# Patient Record
Sex: MALE | Race: WHITE | NOT HISPANIC OR LATINO | Employment: UNEMPLOYED | URBAN - NONMETROPOLITAN AREA
[De-identification: names, ages, dates, MRNs, and addresses within clinical notes are randomized per-mention and may not be internally consistent; named-entity substitution may affect disease eponyms.]

---

## 2022-12-01 PROCEDURE — 93306 TTE W/DOPPLER COMPLETE: CPT | Performed by: INTERNAL MEDICINE

## 2022-12-07 ENCOUNTER — OUTSIDE FACILITY SERVICE (OUTPATIENT)
Dept: CARDIOLOGY | Facility: CLINIC | Age: 57
End: 2022-12-07

## 2023-04-26 ENCOUNTER — OFFICE VISIT (OUTPATIENT)
Dept: CARDIOLOGY | Facility: CLINIC | Age: 58
End: 2023-04-26
Payer: COMMERCIAL

## 2023-04-26 VITALS
HEIGHT: 61 IN | SYSTOLIC BLOOD PRESSURE: 140 MMHG | WEIGHT: 163 LBS | DIASTOLIC BLOOD PRESSURE: 80 MMHG | BODY MASS INDEX: 30.78 KG/M2 | HEART RATE: 83 BPM

## 2023-04-26 DIAGNOSIS — E78.00 HYPERCHOLESTEREMIA: ICD-10-CM

## 2023-04-26 DIAGNOSIS — R06.02 SHORTNESS OF BREATH: ICD-10-CM

## 2023-04-26 DIAGNOSIS — R01.1 MURMUR, CARDIAC: ICD-10-CM

## 2023-04-26 DIAGNOSIS — R09.89 BRUIT OF RIGHT CAROTID ARTERY: ICD-10-CM

## 2023-04-26 DIAGNOSIS — E88.81 METABOLIC SYNDROME: ICD-10-CM

## 2023-04-26 DIAGNOSIS — R07.89 CHEST PRESSURE: Primary | ICD-10-CM

## 2023-04-26 DIAGNOSIS — I10 PRIMARY HYPERTENSION: ICD-10-CM

## 2023-04-26 DIAGNOSIS — R42 DIZZINESS: ICD-10-CM

## 2023-04-26 PROBLEM — E88.810 METABOLIC SYNDROME: Status: ACTIVE | Noted: 2023-04-26

## 2023-04-26 PROCEDURE — 3079F DIAST BP 80-89 MM HG: CPT | Performed by: INTERNAL MEDICINE

## 2023-04-26 PROCEDURE — 3077F SYST BP >= 140 MM HG: CPT | Performed by: INTERNAL MEDICINE

## 2023-04-26 PROCEDURE — 93000 ELECTROCARDIOGRAM COMPLETE: CPT | Performed by: INTERNAL MEDICINE

## 2023-04-26 PROCEDURE — 1160F RVW MEDS BY RX/DR IN RCRD: CPT | Performed by: INTERNAL MEDICINE

## 2023-04-26 PROCEDURE — 99204 OFFICE O/P NEW MOD 45 MIN: CPT | Performed by: INTERNAL MEDICINE

## 2023-04-26 PROCEDURE — 1159F MED LIST DOCD IN RCRD: CPT | Performed by: INTERNAL MEDICINE

## 2023-04-26 RX ORDER — CITALOPRAM 20 MG/1
20 TABLET ORAL DAILY
COMMUNITY

## 2023-04-26 RX ORDER — RISPERIDONE 1 MG/1
1 TABLET ORAL 2 TIMES DAILY
COMMUNITY

## 2023-04-26 RX ORDER — SIMVASTATIN 10 MG
10 TABLET ORAL NIGHTLY
COMMUNITY
End: 2023-04-26

## 2023-04-26 RX ORDER — TAMSULOSIN HYDROCHLORIDE 0.4 MG/1
1 CAPSULE ORAL DAILY
COMMUNITY

## 2023-04-26 RX ORDER — ROSUVASTATIN CALCIUM 10 MG/1
10 TABLET, COATED ORAL DAILY
Qty: 30 TABLET | Refills: 11 | Status: SHIPPED | OUTPATIENT
Start: 2023-04-26

## 2023-04-26 RX ORDER — LISINOPRIL 10 MG/1
10 TABLET ORAL DAILY
COMMUNITY

## 2023-04-26 RX ORDER — AMLODIPINE BESYLATE 5 MG/1
5 TABLET ORAL DAILY
Qty: 30 TABLET | Refills: 11 | Status: SHIPPED | OUTPATIENT
Start: 2023-04-26

## 2023-04-26 RX ORDER — ASPIRIN 81 MG/1
81 TABLET ORAL DAILY
COMMUNITY

## 2023-04-26 RX ORDER — NITROGLYCERIN 0.4 MG/1
0.4 TABLET SUBLINGUAL
COMMUNITY

## 2023-04-26 RX ORDER — OMEPRAZOLE 20 MG/1
20 CAPSULE, DELAYED RELEASE ORAL DAILY
COMMUNITY

## 2023-04-26 NOTE — LETTER
April 26, 2023       No Recipients    Patient: Byron Madsen   YOB: 1965   Date of Visit: 4/26/2023       Dear Dr. Hale Recipients:    Thank you for referring Byron Madsen to me for evaluation. Below are the relevant portions of my assessment and plan of care.    If you have questions, please do not hesitate to call me. I look forward to following Byron along with you.         Sincerely,        Jaycob Butler MD        CC:   No Recipients    Jaycob Butler MD  04/26/23 1110  Sign when Signing Visit  Chief Complaint   Patient presents with   • Establish Care     PCP referred, murmur, abnormal echo   • Chest Pain     Exertional or with increase stress, chest pressure in mid to left chest that radiates to left shoulder, becomes SOB. Symptoms resolve after resting. Started about 2 years ago, now occurring once a week   • Shortness of Breath     Really SOB with walking up an incline.    • Cardiac history     Murmur found 2 years ago while living in [place], no testing at that time. Echo in Dec 2022 shortly after moving here.    • Labs     Labs from Nov in chart, reports that is the last other than PSA having been rechecked.    • Elevated PSA     Pt said he is to see the Urologist due to elevated PSA but they wanted him seen here first.         CARDIAC COMPLAINTS  chest pressure/discomfort, dyspnea and Dizziness     Subjective    Byron Madsen is a 58 y.o. male came in today for his initial cardiac evaluation.  He has history of hypertension, hypercholesterolemia has been having symptoms of chest pain and shortness of breath for almost 2 years now.  This chest pain initially started on moderate exertion.  It is a tightness in the mid part of the chest radiating to the left shoulder.  It is associated with shortness of breath.  Most of the time the symptoms do get better after taking some rest.  It used to occur once or twice a month but now is occurring almost 2 or 3 times a week.  He also has  been noticing increasing shortness of breath when he walks uphill.  He has to stop multiple times to feel better.  He also has been having episodes of dizziness.  He was told that he had a heart murmur and underwent echocardiogram.  The echocardiogram showed overall LV function was normal.  He does have moderate aortic valvular stenosis and regurgitation with a normal PA pressure.  He also underwent lab work in November.  His blood count was normal.  His blood sugar was 108.  His renal function and liver function was normal.  His cholesterol is 181 but the LDL is still 107.  His vitamin D was slightly low at 23.3.  His TSH was normal.  His PSA was elevated at 14.3.  He does have an appointment to see a neurologist regarding this.  He does have a history of enlarged prostate in the past.  He used to smoke about 1 and a packs a day and quit in 2018.  He uses occasional marijuana.  His mother  with a massive heart attack.  She did have valvular heart disease prior to that.  Heart disease does run in the family where one of his sister also had a heart attack.    Past Surgical History:   Procedure Laterality Date   • ECHO - CONVERTED  2022    @ [facility]. EF 60%. SIOMARA- 1.6. . Mild-Mod AI. Mild MR. RVSP-25 mmHg.       Current Outpatient Medications   Medication Sig Dispense Refill   • aspirin 81 MG EC tablet Take 1 tablet by mouth Daily.     • citalopram (CeleXA) 20 MG tablet Take 1 tablet by mouth Daily.     • Diclofenac Sodium (VOLTAREN) 1 % gel gel APPLY 2 GRAMS TO AFFECTED AREA(S) TWICE A DAY AS NEEDED     • lisinopril (PRINIVIL,ZESTRIL) 10 MG tablet Take 1 tablet by mouth Daily.     • nitroglycerin (NITROSTAT) 0.4 MG SL tablet Place 1 tablet under the tongue Every 5 (Five) Minutes As Needed for Chest Pain. Take no more than 3 doses in 15 minutes.     • omeprazole (priLOSEC) 20 MG capsule Take 1 capsule by mouth Daily.     • risperiDONE (risperDAL) 1 MG tablet Take 1 tablet by mouth 2 (Two) Times a Day.     •  tamsulosin (FLOMAX) 0.4 MG capsule 24 hr capsule Take 1 capsule by mouth Daily.     • VITAMIN D, CHOLECALCIFEROL, PO Take  by mouth Daily.     • amLODIPine (NORVASC) 5 MG tablet Take 1 tablet by mouth Daily. 30 tablet 11   • rosuvastatin (CRESTOR) 10 MG tablet Take 1 tablet by mouth Daily. 30 tablet 11     No current facility-administered medications for this visit.          ALLERGIES:  Patient has no known allergies.    Past Medical History:   Diagnosis Date   • Bipolar disorder    • Depression    • GERD (gastroesophageal reflux disease)    • Heart murmur    • Hyperlipidemia    • Hypertension        Social History     Tobacco Use   Smoking Status Former   • Packs/day: 1.50   • Years: 1.00   • Pack years: 1.50   • Types: Cigarettes   • Quit date:    • Years since quittin.3   Smokeless Tobacco Never          Family History   Problem Relation Age of Onset   • Hypertension Mother    • Hyperlipidemia Mother    • Heart attack Mother          at 78 with MI   • Hypertension Father    • Hyperlipidemia Father    • Heart attack Sister         MI in her 30's   • Hyperlipidemia Sister    • Hypertension Sister    • Diabetes Sister    • Hypertension Sister    • Hyperlipidemia Sister    • Hypertension Sister    • Hyperlipidemia Sister    • Other Brother          at 42, history of brain damage since birth   • Diabetes Maternal Grandmother    • Heart attack Maternal Grandmother    • Diabetes Maternal Grandfather    • Heart attack Maternal Grandfather    • Diabetes Paternal Grandmother    • Heart attack Paternal Grandmother    • Diabetes Paternal Grandfather    • Heart attack Paternal Grandfather        Review of Systems   Constitutional: Negative for decreased appetite and malaise/fatigue.   HENT: Negative for congestion and sore throat.    Eyes: Negative for blurred vision, double vision and visual disturbance.   Cardiovascular: Positive for chest pain and dyspnea on exertion.   Respiratory: Positive for shortness  "of breath. Negative for snoring.    Endocrine: Negative for cold intolerance and heat intolerance.   Hematologic/Lymphatic: Negative for adenopathy. Does not bruise/bleed easily.   Skin: Negative for itching, nail changes and skin cancer.   Musculoskeletal: Negative for arthritis and myalgias.   Gastrointestinal: Negative for abdominal pain, dysphagia and heartburn.   Genitourinary: Negative for bladder incontinence and frequency.   Neurological: Positive for dizziness. Negative for seizures and vertigo.   Psychiatric/Behavioral: Negative for altered mental status.   Allergic/Immunologic: Negative for environmental allergies and hives.       Diabetes- No  Thyroid- normal    Objective      /80 (BP Location: Right arm)   Pulse 83   Ht 154.9 cm (61\")   Wt 73.9 kg (163 lb)   BMI 30.80 kg/m²     Vitals and nursing note reviewed.   Constitutional:       Appearance: Healthy appearance. Not in distress.   Eyes:      Conjunctiva/sclera: Conjunctivae normal.      Pupils: Pupils are equal, round, and reactive to light.   HENT:      Head: Normocephalic.   Pulmonary:      Effort: Pulmonary effort is normal.      Breath sounds: Normal breath sounds.   Cardiovascular:      PMI at left midclavicular line. Normal rate. Regular rhythm.      Murmurs: There is a grade 4/6 harsh midsystolic murmur at the URSB, radiating to the neck. There is a grade 3/4 high frequency blowing decrescendo, early diastolic murmur at the URSB, radiating to the apex.   Abdominal:      General: Bowel sounds are normal.      Palpations: Abdomen is soft.   Musculoskeletal: Normal range of motion.      Cervical back: Normal range of motion and neck supple. Skin:     General: Skin is warm and dry.   Neurological:      Mental Status: Alert, oriented to person, place, and time and oriented to person, place and time.           ECG 12 Lead    Date/Time: 4/26/2023 11:10 AM  Performed by: Jaycob Butler MD  Authorized by: Jaycob Butler MD "   Previous ECG: no previous ECG available  Rhythm: sinus rhythm  Rate: normal  QRS axis: normal  Other findings: non-specific ST-T wave changes    Clinical impression: non-specific ECG             @ASSESSMENT/PLAN@  BMI is >= 30 and <35. (Class 1 Obesity). The following options were offered after discussion;: weight loss educational material (shared in after visit summary), exercise counseling/recommendations and nutrition counseling/recommendations     Diagnoses and all orders for this visit:    1. Chest pressure (Primary)  -     Stress Test With Myocardial Perfusion One Day; Future    2. Shortness of breath  -     Adult Transthoracic Echo Complete W/ Cont if Necessary Per Protocol; Future    3. Primary hypertension  -     amLODIPine (NORVASC) 5 MG tablet; Take 1 tablet by mouth Daily.  Dispense: 30 tablet; Refill: 11    4. Hypercholesteremia  -     Stress Test With Myocardial Perfusion One Day; Future  -     rosuvastatin (CRESTOR) 10 MG tablet; Take 1 tablet by mouth Daily.  Dispense: 30 tablet; Refill: 11    5. Murmur, cardiac  -     Adult Transthoracic Echo Complete W/ Cont if Necessary Per Protocol; Future    6. Dizziness  -     Adult Transthoracic Echo Complete W/ Cont if Necessary Per Protocol; Future  -     US Carotid Bilateral; Future    7. Bruit of right carotid artery  -     US Carotid Bilateral; Future    8. Metabolic syndrome    At baseline his heart rate is stable.  His blood pressure is slightly elevated.  His EKG showed sinus rhythm, short DE interval, diffuse ST-T changes.  His clinical examination reveals a BMI of 31.  He does have a carotid bruit on the right side.  He also has 4/6 ejection systolic murmur at the aortic area and a 3/6 diastolic murmur at the aortic area.   his peripheral pulses were normal.    Regarding the chest pressure, it is little worrisome for coronary artery disease.  The other possibility could be secondary to the valvular heart disease.  His EKG does look little abnormal.   He needs to undergo a stress test.  Since he is not able to walk much, it will be done as a Lexiscan.  Meanwhile continue aspirin at 81 mg once a day    Regarding the shortness of breath, it could be from the valvular heart disease.  Clinically the aortic stenosis seems to be little bit more significant.  I like to repeat the echocardiogram to reevaluate the valvular structures and also to rule out bicuspid aortic valve.    Regarding his hypertension, it is still slightly elevated.  He is already on lisinopril.  I added amlodipine 5 mg once a day.    Regarding his hypercholesterolemia his LDL is still elevated since Zocor can interfere with Norvasc also, I stopped Zocor and start him on Crestor 10 mg once a day    Regarding his cardiac murmur, I did explain to him about the valvular heart disease and I explained to him that when it became significant then he may need to undergo TAVAR    Regarding his dizziness and also questionable bruit on the right side, I scheduled him to undergo carotid ultrasound to evaluate the stenosis    Regarding metabolic syndrome, talked to him about diet and weight reduction.  I gave him papers on Mediterranean diet.              Electronically signed by Jaycob Butler MD April 26, 2023 11:08 EDT

## 2023-04-26 NOTE — PROGRESS NOTES
Chief Complaint   Patient presents with   • Establish Care     PCP referred, murmur, abnormal echo   • Chest Pain     Exertional or with increase stress, chest pressure in mid to left chest that radiates to left shoulder, becomes SOB. Symptoms resolve after resting. Started about 2 years ago, now occurring once a week   • Shortness of Breath     Really SOB with walking up an incline.    • Cardiac history     Murmur found 2 years ago while living in [place], no testing at that time. Echo in Dec 2022 shortly after moving here.    • Labs     Labs from Nov in chart, reports that is the last other than PSA having been rechecked.    • Elevated PSA     Pt said he is to see the Urologist due to elevated PSA but they wanted him seen here first.         CARDIAC COMPLAINTS  chest pressure/discomfort, dyspnea and Dizziness      Subjective   Byron Madsen is a 58 y.o. male came in today for his initial cardiac evaluation.  He has history of hypertension, hypercholesterolemia has been having symptoms of chest pain and shortness of breath for almost 2 years now.  This chest pain initially started on moderate exertion.  It is a tightness in the mid part of the chest radiating to the left shoulder.  It is associated with shortness of breath.  Most of the time the symptoms do get better after taking some rest.  It used to occur once or twice a month but now is occurring almost 2 or 3 times a week.  He also has been noticing increasing shortness of breath when he walks uphill.  He has to stop multiple times to feel better.  He also has been having episodes of dizziness.  He was told that he had a heart murmur and underwent echocardiogram.  The echocardiogram showed overall LV function was normal.  He does have moderate aortic valvular stenosis and regurgitation with a normal PA pressure.  He also underwent lab work in November.  His blood count was normal.  His blood sugar was 108.  His renal function and liver function was normal.   His cholesterol is 181 but the LDL is still 107.  His vitamin D was slightly low at 23.3.  His TSH was normal.  His PSA was elevated at 14.3.  He does have an appointment to see a neurologist regarding this.  He does have a history of enlarged prostate in the past.  He used to smoke about 1 and a packs a day and quit in 2018.  He uses occasional marijuana.  His mother  with a massive heart attack.  She did have valvular heart disease prior to that.  Heart disease does run in the family where one of his sister also had a heart attack.    Past Surgical History:   Procedure Laterality Date   • ECHO - CONVERTED  2022    @ [facility]. EF 60%. SIOMARA- 1.6. . Mild-Mod AI. Mild MR. RVSP-25 mmHg.       Current Outpatient Medications   Medication Sig Dispense Refill   • aspirin 81 MG EC tablet Take 1 tablet by mouth Daily.     • citalopram (CeleXA) 20 MG tablet Take 1 tablet by mouth Daily.     • Diclofenac Sodium (VOLTAREN) 1 % gel gel APPLY 2 GRAMS TO AFFECTED AREA(S) TWICE A DAY AS NEEDED     • lisinopril (PRINIVIL,ZESTRIL) 10 MG tablet Take 1 tablet by mouth Daily.     • nitroglycerin (NITROSTAT) 0.4 MG SL tablet Place 1 tablet under the tongue Every 5 (Five) Minutes As Needed for Chest Pain. Take no more than 3 doses in 15 minutes.     • omeprazole (priLOSEC) 20 MG capsule Take 1 capsule by mouth Daily.     • risperiDONE (risperDAL) 1 MG tablet Take 1 tablet by mouth 2 (Two) Times a Day.     • tamsulosin (FLOMAX) 0.4 MG capsule 24 hr capsule Take 1 capsule by mouth Daily.     • VITAMIN D, CHOLECALCIFEROL, PO Take  by mouth Daily.     • amLODIPine (NORVASC) 5 MG tablet Take 1 tablet by mouth Daily. 30 tablet 11   • rosuvastatin (CRESTOR) 10 MG tablet Take 1 tablet by mouth Daily. 30 tablet 11     No current facility-administered medications for this visit.           ALLERGIES:  Patient has no known allergies.    Past Medical History:   Diagnosis Date   • Bipolar disorder    • Depression    • GERD (gastroesophageal  reflux disease)    • Heart murmur    • Hyperlipidemia    • Hypertension        Social History     Tobacco Use   Smoking Status Former   • Packs/day: 1.50   • Years: 1.00   • Pack years: 1.50   • Types: Cigarettes   • Quit date:    • Years since quittin.3   Smokeless Tobacco Never          Family History   Problem Relation Age of Onset   • Hypertension Mother    • Hyperlipidemia Mother    • Heart attack Mother          at 78 with MI   • Hypertension Father    • Hyperlipidemia Father    • Heart attack Sister         MI in her 30's   • Hyperlipidemia Sister    • Hypertension Sister    • Diabetes Sister    • Hypertension Sister    • Hyperlipidemia Sister    • Hypertension Sister    • Hyperlipidemia Sister    • Other Brother          at 42, history of brain damage since birth   • Diabetes Maternal Grandmother    • Heart attack Maternal Grandmother    • Diabetes Maternal Grandfather    • Heart attack Maternal Grandfather    • Diabetes Paternal Grandmother    • Heart attack Paternal Grandmother    • Diabetes Paternal Grandfather    • Heart attack Paternal Grandfather        Review of Systems   Constitutional: Negative for decreased appetite and malaise/fatigue.   HENT: Negative for congestion and sore throat.    Eyes: Negative for blurred vision, double vision and visual disturbance.   Cardiovascular: Positive for chest pain and dyspnea on exertion.   Respiratory: Positive for shortness of breath. Negative for snoring.    Endocrine: Negative for cold intolerance and heat intolerance.   Hematologic/Lymphatic: Negative for adenopathy. Does not bruise/bleed easily.   Skin: Negative for itching, nail changes and skin cancer.   Musculoskeletal: Negative for arthritis and myalgias.   Gastrointestinal: Negative for abdominal pain, dysphagia and heartburn.   Genitourinary: Negative for bladder incontinence and frequency.   Neurological: Positive for dizziness. Negative for seizures and vertigo.  "  Psychiatric/Behavioral: Negative for altered mental status.   Allergic/Immunologic: Negative for environmental allergies and hives.       Diabetes- No  Thyroid- normal    Objective     /80 (BP Location: Right arm)   Pulse 83   Ht 154.9 cm (61\")   Wt 73.9 kg (163 lb)   BMI 30.80 kg/m²     Vitals and nursing note reviewed.   Constitutional:       Appearance: Healthy appearance. Not in distress.   Eyes:      Conjunctiva/sclera: Conjunctivae normal.      Pupils: Pupils are equal, round, and reactive to light.   HENT:      Head: Normocephalic.   Pulmonary:      Effort: Pulmonary effort is normal.      Breath sounds: Normal breath sounds.   Cardiovascular:      PMI at left midclavicular line. Normal rate. Regular rhythm.      Murmurs: There is a grade 4/6 harsh midsystolic murmur at the URSB, radiating to the neck. There is a grade 3/4 high frequency blowing decrescendo, early diastolic murmur at the URSB, radiating to the apex.   Abdominal:      General: Bowel sounds are normal.      Palpations: Abdomen is soft.   Musculoskeletal: Normal range of motion.      Cervical back: Normal range of motion and neck supple. Skin:     General: Skin is warm and dry.   Neurological:      Mental Status: Alert, oriented to person, place, and time and oriented to person, place and time.           ECG 12 Lead    Date/Time: 4/26/2023 11:10 AM  Performed by: Jaycob Butler MD  Authorized by: Jaycob Butler MD   Previous ECG: no previous ECG available  Rhythm: sinus rhythm  Rate: normal  QRS axis: normal  Other findings: non-specific ST-T wave changes    Clinical impression: non-specific ECG              @ASSESSMENT/PLAN@  BMI is >= 30 and <35. (Class 1 Obesity). The following options were offered after discussion;: weight loss educational material (shared in after visit summary), exercise counseling/recommendations and nutrition counseling/recommendations     Diagnoses and all orders for this visit:    1. Chest " pressure (Primary)  -     Stress Test With Myocardial Perfusion One Day; Future    2. Shortness of breath  -     Adult Transthoracic Echo Complete W/ Cont if Necessary Per Protocol; Future    3. Primary hypertension  -     amLODIPine (NORVASC) 5 MG tablet; Take 1 tablet by mouth Daily.  Dispense: 30 tablet; Refill: 11    4. Hypercholesteremia  -     Stress Test With Myocardial Perfusion One Day; Future  -     rosuvastatin (CRESTOR) 10 MG tablet; Take 1 tablet by mouth Daily.  Dispense: 30 tablet; Refill: 11    5. Murmur, cardiac  -     Adult Transthoracic Echo Complete W/ Cont if Necessary Per Protocol; Future    6. Dizziness  -     Adult Transthoracic Echo Complete W/ Cont if Necessary Per Protocol; Future  -     US Carotid Bilateral; Future    7. Bruit of right carotid artery  -     US Carotid Bilateral; Future    8. Metabolic syndrome    At baseline his heart rate is stable.  His blood pressure is slightly elevated.  His EKG showed sinus rhythm, short AK interval, diffuse ST-T changes.  His clinical examination reveals a BMI of 31.  He does have a carotid bruit on the right side.  He also has 4/6 ejection systolic murmur at the aortic area and a 3/6 diastolic murmur at the aortic area.   his peripheral pulses were normal.    Regarding the chest pressure, it is little worrisome for coronary artery disease.  The other possibility could be secondary to the valvular heart disease.  His EKG does look little abnormal.  He needs to undergo a stress test.  Since he is not able to walk much, it will be done as a Lexiscan.  Meanwhile continue aspirin at 81 mg once a day    Regarding the shortness of breath, it could be from the valvular heart disease.  Clinically the aortic stenosis seems to be little bit more significant.  I like to repeat the echocardiogram to reevaluate the valvular structures and also to rule out bicuspid aortic valve.    Regarding his hypertension, it is still slightly elevated.  He is already on  lisinopril.  I added amlodipine 5 mg once a day.    Regarding his hypercholesterolemia his LDL is still elevated since Zocor can interfere with Norvasc also, I stopped Zocor and start him on Crestor 10 mg once a day    Regarding his cardiac murmur, I did explain to him about the valvular heart disease and I explained to him that when it became significant then he may need to undergo TAVAR    Regarding his dizziness and also questionable bruit on the right side, I scheduled him to undergo carotid ultrasound to evaluate the stenosis    Regarding metabolic syndrome, talked to him about diet and weight reduction.  I gave him papers on Mediterranean diet.               Electronically signed by Jaycob Butler MD April 26, 2023 11:08 EDT

## 2023-05-11 ENCOUNTER — HOSPITAL ENCOUNTER (OUTPATIENT)
Dept: CARDIOLOGY | Facility: HOSPITAL | Age: 58
Discharge: HOME | End: 2023-05-11
Payer: COMMERCIAL

## 2023-05-11 DIAGNOSIS — R01.1 MURMUR, CARDIAC: ICD-10-CM

## 2023-05-11 DIAGNOSIS — R42 DIZZINESS: ICD-10-CM

## 2023-05-11 DIAGNOSIS — R09.89 BRUIT OF RIGHT CAROTID ARTERY: ICD-10-CM

## 2023-05-11 DIAGNOSIS — R07.89 CHEST PRESSURE: ICD-10-CM

## 2023-05-11 DIAGNOSIS — E78.00 HYPERCHOLESTEREMIA: ICD-10-CM

## 2023-05-11 DIAGNOSIS — R06.02 SHORTNESS OF BREATH: ICD-10-CM

## 2023-05-11 LAB
BH CV ECHO MEAS - ACS: 1.09 CM
BH CV ECHO MEAS - AO MAX PG: 40.4 MMHG
BH CV ECHO MEAS - AO MEAN PG: 24.6 MMHG
BH CV ECHO MEAS - AO ROOT DIAM: 2.8 CM
BH CV ECHO MEAS - AO V2 MAX: 308.8 CM/SEC
BH CV ECHO MEAS - AO V2 VTI: 79.9 CM
BH CV ECHO MEAS - AVA(I,D): 1.47 CM2
BH CV ECHO MEAS - EDV(CUBED): 42.1 ML
BH CV ECHO MEAS - EDV(MOD-SP4): 84.2 ML
BH CV ECHO MEAS - EF(MOD-SP4): 49 %
BH CV ECHO MEAS - ESV(CUBED): 5.4 ML
BH CV ECHO MEAS - ESV(MOD-SP4): 42.9 ML
BH CV ECHO MEAS - FS: 49.7 %
BH CV ECHO MEAS - IVS/LVPW: 0.93 CM
BH CV ECHO MEAS - IVSD: 1.4 CM
BH CV ECHO MEAS - LA DIMENSION: 3.5 CM
BH CV ECHO MEAS - LAT PEAK E' VEL: 5.8 CM/SEC
BH CV ECHO MEAS - LV DIASTOLIC VOL/BSA (35-75): 48.6 CM2
BH CV ECHO MEAS - LV MASS(C)D: 181.6 GRAMS
BH CV ECHO MEAS - LV MAX PG: 7.1 MMHG
BH CV ECHO MEAS - LV MEAN PG: 4.6 MMHG
BH CV ECHO MEAS - LV SYSTOLIC VOL/BSA (12-30): 24.8 CM2
BH CV ECHO MEAS - LV V1 MAX: 133.3 CM/SEC
BH CV ECHO MEAS - LV V1 VTI: 34.7 CM
BH CV ECHO MEAS - LVIDD: 3.5 CM
BH CV ECHO MEAS - LVIDS: 1.75 CM
BH CV ECHO MEAS - LVOT AREA: 3.4 CM2
BH CV ECHO MEAS - LVOT DIAM: 2.08 CM
BH CV ECHO MEAS - LVPWD: 1.5 CM
BH CV ECHO MEAS - MED PEAK E' VEL: 5 CM/SEC
BH CV ECHO MEAS - MV A MAX VEL: 116.8 CM/SEC
BH CV ECHO MEAS - MV DEC SLOPE: 564.2 CM/SEC2
BH CV ECHO MEAS - MV E MAX VEL: 158 CM/SEC
BH CV ECHO MEAS - MV E/A: 1.35
BH CV ECHO MEAS - MV MAX PG: 12.5 MMHG
BH CV ECHO MEAS - MV MEAN PG: 4.4 MMHG
BH CV ECHO MEAS - MV P1/2T: 91.6 MSEC
BH CV ECHO MEAS - MV V2 VTI: 57.2 CM
BH CV ECHO MEAS - MVA(P1/2T): 2.4 CM2
BH CV ECHO MEAS - MVA(VTI): 2.06 CM2
BH CV ECHO MEAS - RAP SYSTOLE: 10 MMHG
BH CV ECHO MEAS - RVDD: 2.7 CM
BH CV ECHO MEAS - RVSP: 39.5 MMHG
BH CV ECHO MEAS - SI(MOD-SP4): 23.8 ML/M2
BH CV ECHO MEAS - SV(LVOT): 117.8 ML
BH CV ECHO MEAS - SV(MOD-SP4): 41.3 ML
BH CV ECHO MEAS - TR MAX PG: 29.5 MMHG
BH CV ECHO MEAS - TR MAX VEL: 271.5 CM/SEC
BH CV ECHO MEASUREMENTS AVERAGE E/E' RATIO: 29.26
BH CV REST NUCLEAR ISOTOPE DOSE: 10 MCI
BH CV STRESS COMMENTS STAGE 1: NORMAL
BH CV STRESS DOSE REGADENOSON STAGE 1: 0.4
BH CV STRESS DURATION MIN STAGE 1: 0
BH CV STRESS DURATION SEC STAGE 1: 10
BH CV STRESS NUCLEAR ISOTOPE DOSE: 30 MCI
BH CV STRESS PROTOCOL 1: NORMAL
BH CV STRESS RECOVERY BP: NORMAL MMHG
BH CV STRESS RECOVERY HR: 90 BPM
BH CV STRESS STAGE 1: 1
LEFT ATRIUM VOLUME INDEX: 32.9 ML/M2
LV EF NUC BP: 69 %
MAXIMAL PREDICTED HEART RATE: 162 BPM
MAXIMAL PREDICTED HEART RATE: 162 BPM
PERCENT MAX PREDICTED HR: 67.28 %
STRESS BASELINE BP: NORMAL MMHG
STRESS BASELINE HR: 58 BPM
STRESS PERCENT HR: 79 %
STRESS POST PEAK BP: NORMAL MMHG
STRESS POST PEAK HR: 109 BPM
STRESS TARGET HR: 138 BPM
STRESS TARGET HR: 138 BPM

## 2023-05-11 PROCEDURE — 93880 EXTRACRANIAL BILAT STUDY: CPT | Performed by: RADIOLOGY

## 2023-05-11 PROCEDURE — 93017 CV STRESS TEST TRACING ONLY: CPT

## 2023-05-11 PROCEDURE — 78452 HT MUSCLE IMAGE SPECT MULT: CPT

## 2023-05-11 PROCEDURE — A9500 TC99M SESTAMIBI: HCPCS | Performed by: INTERNAL MEDICINE

## 2023-05-11 PROCEDURE — 93306 TTE W/DOPPLER COMPLETE: CPT | Performed by: INTERNAL MEDICINE

## 2023-05-11 PROCEDURE — 93880 EXTRACRANIAL BILAT STUDY: CPT

## 2023-05-11 PROCEDURE — 0 TECHNETIUM SESTAMIBI: Performed by: INTERNAL MEDICINE

## 2023-05-11 PROCEDURE — 93306 TTE W/DOPPLER COMPLETE: CPT

## 2023-05-11 PROCEDURE — 25010000002 REGADENOSON 0.4 MG/5ML SOLUTION: Performed by: INTERNAL MEDICINE

## 2023-05-11 RX ORDER — REGADENOSON 0.08 MG/ML
0.4 INJECTION, SOLUTION INTRAVENOUS
Status: COMPLETED | OUTPATIENT
Start: 2023-05-11 | End: 2023-05-11

## 2023-05-11 RX ADMIN — REGADENOSON 0.4 MG: 0.08 INJECTION, SOLUTION INTRAVENOUS at 12:06

## 2023-05-11 RX ADMIN — TECHNETIUM TC 99M SESTAMIBI 1 DOSE: 1 INJECTION INTRAVENOUS at 10:57

## 2023-05-11 RX ADMIN — TECHNETIUM TC 99M SESTAMIBI 1 DOSE: 1 INJECTION INTRAVENOUS at 12:06

## 2023-05-12 RX ORDER — ISOSORBIDE MONONITRATE 30 MG/1
30 TABLET, EXTENDED RELEASE ORAL EVERY MORNING
Qty: 90 TABLET | Refills: 2 | Status: SHIPPED | OUTPATIENT
Start: 2023-05-12

## 2023-11-15 ENCOUNTER — OFFICE VISIT (OUTPATIENT)
Dept: CARDIOLOGY | Facility: CLINIC | Age: 58
End: 2023-11-15
Payer: COMMERCIAL

## 2023-11-15 VITALS
WEIGHT: 165 LBS | DIASTOLIC BLOOD PRESSURE: 90 MMHG | SYSTOLIC BLOOD PRESSURE: 140 MMHG | BODY MASS INDEX: 31.15 KG/M2 | HEART RATE: 72 BPM | HEIGHT: 61 IN

## 2023-11-15 DIAGNOSIS — I10 PRIMARY HYPERTENSION: ICD-10-CM

## 2023-11-15 DIAGNOSIS — E78.00 HYPERCHOLESTEREMIA: ICD-10-CM

## 2023-11-15 DIAGNOSIS — R94.39 ABNORMAL NUCLEAR STRESS TEST: Primary | ICD-10-CM

## 2023-11-15 DIAGNOSIS — R01.1 MURMUR, CARDIAC: ICD-10-CM

## 2023-11-15 DIAGNOSIS — F17.200 SMOKER: ICD-10-CM

## 2023-11-15 DIAGNOSIS — I35.0 AORTIC VALVE STENOSIS, ETIOLOGY OF CARDIAC VALVE DISEASE UNSPECIFIED: ICD-10-CM

## 2023-11-15 PROCEDURE — 3077F SYST BP >= 140 MM HG: CPT | Performed by: NURSE PRACTITIONER

## 2023-11-15 PROCEDURE — 1160F RVW MEDS BY RX/DR IN RCRD: CPT | Performed by: NURSE PRACTITIONER

## 2023-11-15 PROCEDURE — 3080F DIAST BP >= 90 MM HG: CPT | Performed by: NURSE PRACTITIONER

## 2023-11-15 PROCEDURE — 99214 OFFICE O/P EST MOD 30 MIN: CPT | Performed by: NURSE PRACTITIONER

## 2023-11-15 PROCEDURE — 1159F MED LIST DOCD IN RCRD: CPT | Performed by: NURSE PRACTITIONER

## 2023-11-15 RX ORDER — LISINOPRIL 20 MG/1
20 TABLET ORAL DAILY
Start: 2023-11-15

## 2023-11-15 RX ORDER — NITROGLYCERIN 0.4 MG/1
0.4 TABLET SUBLINGUAL
Qty: 25 TABLET | Refills: 1 | Status: SHIPPED | OUTPATIENT
Start: 2023-11-15

## 2023-11-15 RX ORDER — ROSUVASTATIN CALCIUM 10 MG/1
10 TABLET, COATED ORAL DAILY
Qty: 30 TABLET | Refills: 8 | Status: SHIPPED | OUTPATIENT
Start: 2023-11-15

## 2023-11-15 RX ORDER — AMLODIPINE BESYLATE 5 MG/1
5 TABLET ORAL DAILY
Qty: 30 TABLET | Refills: 8 | Status: SHIPPED | OUTPATIENT
Start: 2023-11-15

## 2023-11-15 RX ORDER — ISOSORBIDE MONONITRATE 30 MG/1
30 TABLET, EXTENDED RELEASE ORAL EVERY MORNING
Qty: 30 TABLET | Refills: 8 | Status: SHIPPED | OUTPATIENT
Start: 2023-11-15

## 2023-11-15 RX ORDER — LISINOPRIL 10 MG/1
10 TABLET ORAL DAILY
Qty: 30 TABLET | Refills: 8 | Status: SHIPPED | OUTPATIENT
Start: 2023-11-15 | End: 2023-11-15 | Stop reason: SDUPTHER

## 2023-11-15 RX ORDER — ASPIRIN 81 MG/1
81 TABLET ORAL DAILY
Qty: 30 TABLET | Refills: 8 | Status: SHIPPED | OUTPATIENT
Start: 2023-11-15

## 2023-11-15 NOTE — PROGRESS NOTES
Chief Complaint   Patient presents with    Follow-up     Cardiac management    Lab     Last labs 2 months ago per PCP.    Shortness of Breath     Same as before, feels related to asthma.     Palpitations     Has occasional flutters, notices every few days when he over exerts.    Med Refill     Needs refills on cardiac medications including Nitro-90 day.       Subjective       Byron Madsen is a 58 y.o. male initially seen April 2023 for cardiac consultation.  He has HTN, hypercholesterolemia and reported chest pain or shortness of breath.  He reported history of cardiac murmur and had undergone echocardiogram showing normal LV function with moderate aortic stenosis and regurg, PA pressure normal.    At consultation simvastatin was changed to Crestor.    On 5/11/2023 Lexiscan stress test showed small lateral wall ischemia, Imdur added.  Plan for elective cath if symptoms persist.  Echocardiogram mild LVH with EF around 65%.  Moderate aortic stenosis with SIOMARA 1.5 cm² and pressure 25/40 mmHg.  RVSP was 40 mmHg.  Carotid ultrasound was negative for hemodynamically significant stenosis.    Today returns the office for follow-up visit.  He has shortness of breath but denies being worse than prior.  Occasionally he feels palpitations in the form of heart fluttering but denies being worse.  It appears he has not been taking his blood pressure medication.  When bending over he feels dizziness but otherwise denies.    Cardiac History:    Past Surgical History:   Procedure Laterality Date    CARDIOVASCULAR STRESS TEST  05/11/2023    Lexiscan- EF 69%. Lateral Ischemia    ECHO - CONVERTED  12/07/2022    @ [facility]. EF 60%. SIOMARA- 1.6. 19/31. Mild-Mod AI. Mild MR. RVSP-25 mmHg.    ECHO - CONVERTED  05/11/2023    EF 65%. SIOMARA- 1.5 Cm2. 25/40. Trace-Mild MR. RVSP- 40 mmHg    US CAROTID UNILATERAL  05/11/2023    No sig lesions       Current Outpatient Medications   Medication Sig Dispense Refill    amLODIPine (NORVASC) 5 MG tablet Take 1  tablet by mouth Daily. 30 tablet 8    aspirin 81 MG EC tablet Take 1 tablet by mouth Daily. 30 tablet 8    Diclofenac Sodium (VOLTAREN) 1 % gel gel APPLY 2 GRAMS TO AFFECTED AREA(S) TWICE A DAY AS NEEDED      isosorbide mononitrate (IMDUR) 30 MG 24 hr tablet Take 1 tablet by mouth Every Morning. 30 tablet 8    lisinopril (PRINIVIL,ZESTRIL) 10 MG tablet Take 1 tablet by mouth Daily. 30 tablet 8    nitroglycerin (NITROSTAT) 0.4 MG SL tablet Place 1 tablet under the tongue Every 5 (Five) Minutes As Needed for Chest Pain. Take no more than 3 doses in 15 minutes. 25 tablet 1    omeprazole (priLOSEC) 20 MG capsule Take 1 capsule by mouth Daily.      risperiDONE (risperDAL) 1 MG tablet Take 2 tablets by mouth Every Night.      rosuvastatin (CRESTOR) 10 MG tablet Take 1 tablet by mouth Daily. 30 tablet 8    sertraline (ZOLOFT) 50 MG tablet Take 1 tablet by mouth Daily.      tamsulosin (FLOMAX) 0.4 MG capsule 24 hr capsule Take 1 capsule by mouth Daily.      VITAMIN D, CHOLECALCIFEROL, PO Take 5,000 Units by mouth Daily.       No current facility-administered medications for this visit.       Patient has no known allergies.    Past Medical History:   Diagnosis Date    Bipolar disorder     Depression     GERD (gastroesophageal reflux disease)     Heart murmur     Hyperlipidemia     Hypertension        Social History     Socioeconomic History    Marital status: Single   Tobacco Use    Smoking status: Former     Packs/day: 1.50     Years: 1.00     Additional pack years: 0.00     Total pack years: 1.50     Types: Cigarettes     Quit date:      Years since quittin.8    Smokeless tobacco: Never   Vaping Use    Vaping Use: Every day    Substances: Nicotine, Flavoring    Devices: Refillable tank   Substance and Sexual Activity    Alcohol use: Not Currently    Drug use: Yes     Types: Marijuana     Comment: last used Meth in 2018, currently smokes about 8 joints daily of marijuana    Sexual activity: Defer       Family  History   Problem Relation Age of Onset    Hypertension Mother     Hyperlipidemia Mother     Heart attack Mother          at 78 with MI    Hypertension Father     Hyperlipidemia Father     Heart attack Sister         MI in her 30's    Hyperlipidemia Sister     Hypertension Sister     Diabetes Sister     Hypertension Sister     Hyperlipidemia Sister     Hypertension Sister     Hyperlipidemia Sister     Other Brother          at 42, history of brain damage since birth    Diabetes Maternal Grandmother     Heart attack Maternal Grandmother     Diabetes Maternal Grandfather     Heart attack Maternal Grandfather     Diabetes Paternal Grandmother     Heart attack Paternal Grandmother     Diabetes Paternal Grandfather     Heart attack Paternal Grandfather        Review of Systems   Constitutional: Negative for malaise/fatigue.   HENT:  Negative for congestion and nosebleeds.    Cardiovascular:  Positive for palpitations. Negative for chest pain, dyspnea on exertion and leg swelling.   Respiratory:  Positive for shortness of breath. Negative for sleep disturbances due to breathing.    Hematologic/Lymphatic: Negative for bleeding problem.   Skin:  Negative for color change.   Musculoskeletal:  Negative for falls, muscle cramps and myalgias.   Gastrointestinal:  Negative for change in bowel habit.   Genitourinary:  Negative for hematuria.   Neurological:  Positive for dizziness (only when bending over). Negative for headaches and light-headedness.   Psychiatric/Behavioral:  Negative for memory loss. The patient is nervous/anxious.         BP Readings from Last 5 Encounters:   11/15/23 140/90   23 140/80       Wt Readings from Last 5 Encounters:   11/15/23 74.8 kg (165 lb)   23 73.9 kg (163 lb)       Objective     Labs: Glucose 108, renal and liver function normal, cholesterol 181, , vitamin D 23.3, TSH normal, PSA elevated at 14.3    /90 (BP Location: Left arm)   Pulse 72   Ht 154.9 cm  "(60.98\")   Wt 74.8 kg (165 lb)   BMI 31.19 kg/m²     Vitals and nursing note reviewed.   Constitutional:       Appearance: Not in distress.   Eyes:      Conjunctiva/sclera: Conjunctivae normal.   Pulmonary:      Effort: Pulmonary effort is normal.      Breath sounds: Normal breath sounds.   Cardiovascular:      PMI at left midclavicular line. Normal rate. Regular rhythm.      Murmurs: There is a grade 3/6 harsh systolic murmur.   Pulses:     Intact distal pulses.   Edema:     Peripheral edema absent.   Abdominal:      General: Bowel sounds are normal.      Palpations: Abdomen is soft.   Musculoskeletal:      Cervical back: Neck supple. Skin:     General: Skin is warm and dry.      Coloration: Skin is not pale.   Neurological:      Mental Status: Alert and oriented to person, place and time.   Psychiatric:         Behavior: Behavior is cooperative.          Procedures: none today          Assessment & Plan   Diagnoses and all orders for this visit:    1. Abnormal nuclear stress test (Primary)  -     nitroglycerin (NITROSTAT) 0.4 MG SL tablet; Place 1 tablet under the tongue Every 5 (Five) Minutes As Needed for Chest Pain. Take no more than 3 doses in 15 minutes.  Dispense: 25 tablet; Refill: 1  -     aspirin 81 MG EC tablet; Take 1 tablet by mouth Daily.  Dispense: 30 tablet; Refill: 8  -     isosorbide mononitrate (IMDUR) 30 MG 24 hr tablet; Take 1 tablet by mouth Every Morning.  Dispense: 30 tablet; Refill: 8    2. Primary hypertension  -     lisinopril (PRINIVIL,ZESTRIL) 10 MG tablet; Take 1 tablet by mouth Daily.  Dispense: 30 tablet; Refill: 8  -     amLODIPine (NORVASC) 5 MG tablet; Take 1 tablet by mouth Daily.  Dispense: 30 tablet; Refill: 8    3. Hypercholesteremia  -     rosuvastatin (CRESTOR) 10 MG tablet; Take 1 tablet by mouth Daily.  Dispense: 30 tablet; Refill: 8    4. Murmur, cardiac    5. Aortic valve stenosis, etiology of cardiac valve disease unspecified    6. Smoker    Abnormal stress " test  -Results of stress test reviewed with patient showing small area of ischemia  -Continue Imdur, Nitrostat  -Continue aspirin, statin  -Patient denies chest pain or worsening symptoms  -Instructed patient to go to the emergency department for significant symptoms or for concerns to call our office or contact PCP.    Hypertension  -BP slightly elevated  -Patient admits he has not been taking blood pressure medication but unsure why.   -Prescription for lisinopril and amlodipine sent to his pharmacy    Hypercholesterolemia  -Continue Crestor 10 mg  -Advised patient to follow with you for lab orders/management  -Please forward copy of lab results    Cardiac murmur/AS  -Recent echocardiogram showed aortic stenosis with SIOMARA of 1.5 cm²  -Continue surveillance  -Restart medications for better BP management  -Advised to avoid heavy lifting    Smoker  -Cessation encouraged  -Patient agrees to try to decrease smoking but does not feeling completely stop.    6-month follow-up visit scheduled.

## 2024-03-11 ENCOUNTER — TELEPHONE (OUTPATIENT)
Dept: CARDIOLOGY | Facility: CLINIC | Age: 59
End: 2024-03-11
Payer: COMMERCIAL

## 2024-03-11 NOTE — TELEPHONE ENCOUNTER
Received fax from Sulma Smith PA-C for cardiac clearance for patient to have an MRI guided fusion biopsy. Patient is on aspirin, unclear if needing to hold. According to our records, I do not see where patient has had any stenting.          Fax [phone]

## 2024-09-11 ENCOUNTER — TELEPHONE (OUTPATIENT)
Dept: CARDIOLOGY | Facility: CLINIC | Age: 59
End: 2024-09-11
Payer: COMMERCIAL

## 2024-09-11 NOTE — TELEPHONE ENCOUNTER
Caller: Byron Madsen    Relationship: Self    Best call back number: [phone]     What was the call regarding: PT HAD FOLLOW APPT ON 5/22/24 AND DUE TO UNFORSEEN CIRCUMSTANCES, THEY HAD TO RESCHEDULE. NEXT AVAILABLE WAS  9/18/24. JUST WANTED TO MAKE OFFICE AWARE.  APPT NOTES: 6 MOS F/U

## 2024-10-14 ENCOUNTER — TELEPHONE (OUTPATIENT)
Dept: CARDIOLOGY | Facility: CLINIC | Age: 59
End: 2024-10-14

## 2024-10-14 NOTE — TELEPHONE ENCOUNTER
I spoke with patient and his sister , he had ER visit last week for syncope,dizziness and nausea  . ER notes, labs and ECHO results requested from [facility]

## 2024-10-14 NOTE — TELEPHONE ENCOUNTER
Caller: Byron Madsen    Relationship to patient: Self    Best call back number: [phone]     New or established patient?  [] New  [x] Established    Date of discharge: 10.10.24    Facility discharged from: [facility]    Diagnosis/Symptoms: PT HAS PLAQUE BUILDUP IN THE HEART. NO AVAIL APPTS BEFORE NOVEMBER.     Length of stay (If applicable): A FEW HOURS    Specialty Only: Did you see a [facility] health provider?    [] Yes  [x] No  If so, who? NA

## 2024-10-15 NOTE — TELEPHONE ENCOUNTER
I spoke with patients sister and she is aware of scheduled appointment 10- at 9 :00 am . Records from [facility] obtained , patient had Echo done but has not been read yet.  Records scanned in chart for review

## 2024-10-16 ENCOUNTER — OFFICE VISIT (OUTPATIENT)
Dept: CARDIOLOGY | Facility: CLINIC | Age: 59
End: 2024-10-16
Payer: COMMERCIAL

## 2024-10-16 VITALS
SYSTOLIC BLOOD PRESSURE: 130 MMHG | HEIGHT: 61 IN | WEIGHT: 164.8 LBS | BODY MASS INDEX: 31.11 KG/M2 | HEART RATE: 76 BPM | DIASTOLIC BLOOD PRESSURE: 76 MMHG

## 2024-10-16 DIAGNOSIS — E78.00 HYPERCHOLESTEREMIA: ICD-10-CM

## 2024-10-16 DIAGNOSIS — R42 DIZZINESS: ICD-10-CM

## 2024-10-16 DIAGNOSIS — F17.200 SMOKER: ICD-10-CM

## 2024-10-16 DIAGNOSIS — R06.02 SHORTNESS OF BREATH: ICD-10-CM

## 2024-10-16 DIAGNOSIS — I10 PRIMARY HYPERTENSION: ICD-10-CM

## 2024-10-16 DIAGNOSIS — R94.39 ABNORMAL NUCLEAR STRESS TEST: ICD-10-CM

## 2024-10-16 DIAGNOSIS — I35.0 AORTIC VALVE STENOSIS, ETIOLOGY OF CARDIAC VALVE DISEASE UNSPECIFIED: Primary | ICD-10-CM

## 2024-10-16 DIAGNOSIS — R07.89 CHEST PRESSURE: ICD-10-CM

## 2024-10-16 PROCEDURE — 1160F RVW MEDS BY RX/DR IN RCRD: CPT | Performed by: NURSE PRACTITIONER

## 2024-10-16 PROCEDURE — 3078F DIAST BP <80 MM HG: CPT | Performed by: NURSE PRACTITIONER

## 2024-10-16 PROCEDURE — 99214 OFFICE O/P EST MOD 30 MIN: CPT | Performed by: NURSE PRACTITIONER

## 2024-10-16 PROCEDURE — 1159F MED LIST DOCD IN RCRD: CPT | Performed by: NURSE PRACTITIONER

## 2024-10-16 PROCEDURE — 3075F SYST BP GE 130 - 139MM HG: CPT | Performed by: NURSE PRACTITIONER

## 2024-10-16 NOTE — PROGRESS NOTES
Chief Complaint   Patient presents with    Follow-up     Cardiac management . Patient had recent ER visit at [facility] for dizziness and  near syncope .    LABS/TESTING     LABS-    EKG-   Stress test/Echo- 5-2023     Med Refill     Needs refills on Amlodipine, Imdur and Crestor 90 day supply to [facility] pharmacy [facility]       Subjective       Byron Madsen is a 59 y.o. male initially seen April 2023 for cardiac consultation.  He has HTN, hypercholesterolemia and reported chest pain or shortness of breath.  He reported history of cardiac murmur and had undergone echocardiogram showing normal LV function with moderate aortic stenosis and regurg, PA pressure normal.  Consultation statin was changed to Crestor. On 5/11/2023 Lexiscan stress test showed small lateral wall ischemia, Imdur added. Plan for elective cath if symptoms persist. Echocardiogram mild LVH with EF around 65%. Moderate aortic stenosis with SIOMARA 1.5 cm² and pressure 25/40 mmHg. RVSP was 40 mmHg. Carotid ultrasound was negative for hemodynamically significant stenosis.     Today he returns to the office due to recurrence of symptoms. On 10/10/2024 he went to the emergency department at [facility].  EKG showed NSR.  Labs unremarkable.  Due to dizziness he was advised to hold Flomax.  An echocardiogram was completed but report not yet available.  He admits some improvement of dizziness after stopping Flomax as well as decreasing the amount of vaping.  However, he has occasional chest pressure and persistent shortness of breath.    Cardiac History:    Past Surgical History:   Procedure Laterality Date    CARDIOVASCULAR STRESS TEST  05/11/2023    Lexiscan- EF 69%. Lateral Ischemia    ECHO - CONVERTED  12/07/2022    @ [facility]. EF 60%. SIOMARA- 1.6. 19/31. Mild-Mod AI. Mild MR. RVSP-25 mmHg.    ECHO - CONVERTED  05/11/2023    EF 65%. SIOMARA- 1.5 Cm2. 25/40. Trace-Mild MR. RVSP- 40 mmHg    US CAROTID UNILATERAL  05/11/2023    No sig lesions        Current Outpatient Medications   Medication Sig Dispense Refill    amLODIPine (NORVASC) 5 MG tablet Take 1 tablet by mouth Daily. 30 tablet 8    aspirin 81 MG EC tablet Take 1 tablet by mouth Daily. 30 tablet 8    Diclofenac Sodium (VOLTAREN) 1 % gel gel APPLY 2 GRAMS TO AFFECTED AREA(S) TWICE A DAY AS NEEDED      isosorbide mononitrate (IMDUR) 30 MG 24 hr tablet Take 1 tablet by mouth Every Morning. 30 tablet 8    lisinopril (PRINIVIL,ZESTRIL) 20 MG tablet Take 1 tablet by mouth Daily.      nitroglycerin (NITROSTAT) 0.4 MG SL tablet Place 1 tablet under the tongue Every 5 (Five) Minutes As Needed for Chest Pain. Take no more than 3 doses in 15 minutes. 25 tablet 1    omeprazole (priLOSEC) 20 MG capsule Take 1 capsule by mouth Daily.      risperiDONE (risperDAL) 1 MG tablet Take 2 tablets by mouth Every Night.      rosuvastatin (CRESTOR) 10 MG tablet Take 1 tablet by mouth Daily. 30 tablet 8    sertraline (ZOLOFT) 50 MG tablet Take 1 tablet by mouth Daily.      VITAMIN D, CHOLECALCIFEROL, PO Take 5,000 Units by mouth Daily.       No current facility-administered medications for this visit.       Patient has no known allergies.    Past Medical History:   Diagnosis Date    Bipolar disorder     Depression     GERD (gastroesophageal reflux disease)     Heart murmur     Hyperlipidemia     Hypertension        Social History     Socioeconomic History    Marital status: Single   Tobacco Use    Smoking status: Former     Current packs/day: 0.00     Average packs/day: 1.5 packs/day for 1 year (1.5 ttl pk-yrs)     Types: Cigarettes     Start date:      Quit date: 2018     Years since quittin.7    Smokeless tobacco: Never   Vaping Use    Vaping status: Every Day    Substances: Nicotine, Flavoring    Devices: Refillable tank   Substance and Sexual Activity    Alcohol use: Not Currently    Drug use: Yes     Types: Marijuana     Comment: last used Meth in 2018, currently smokes about 8 joints daily of marijuana     "Sexual activity: Defer       Family History   Problem Relation Age of Onset    Hypertension Mother     Hyperlipidemia Mother     Heart attack Mother          at 78 with MI    Hypertension Father     Hyperlipidemia Father     Heart attack Sister         MI in her 30's    Hyperlipidemia Sister     Hypertension Sister     Diabetes Sister     Hypertension Sister     Hyperlipidemia Sister     Hypertension Sister     Hyperlipidemia Sister     Other Brother          at 42, history of brain damage since birth    Diabetes Maternal Grandmother     Heart attack Maternal Grandmother     Diabetes Maternal Grandfather     Heart attack Maternal Grandfather     Diabetes Paternal Grandmother     Heart attack Paternal Grandmother     Diabetes Paternal Grandfather     Heart attack Paternal Grandfather        Review of Systems   Cardiovascular:  Positive for chest pain (Pressure). Negative for leg swelling.   Respiratory:  Positive for shortness of breath.    Hematologic/Lymphatic: Negative for bleeding problem.   Musculoskeletal:  Negative for falls.   Neurological:  Positive for dizziness and light-headedness. Negative for brief paralysis and numbness.        BP Readings from Last 5 Encounters:   10/16/24 130/76   11/15/23 140/90   23 140/80       Wt Readings from Last 5 Encounters:   10/16/24 74.8 kg (164 lb 12.8 oz)   11/15/23 74.8 kg (165 lb)   23 73.9 kg (163 lb)       Objective     Labs 10/10/2024: CBC in normal range except hematocrit 40.4 urinalysis negative, glucose 122, BUN 17, creatinine 0.96, GFR greater than 60, sodium 139, potassium 3.9, calcium 8.8, total protein 6.4, Beman 4.3, total bili 0.3, AST 14, ALT 10, , CK 74, CK-MB initial normal at 2.5, troponin negative, BNP 86    /76 (BP Location: Left arm, Patient Position: Sitting, Cuff Size: Adult)   Pulse 76   Ht 154.9 cm (60.98\")   Wt 74.8 kg (164 lb 12.8 oz)   BMI 31.16 kg/m²     Vitals and nursing note reviewed.   Constitutional:  "      Appearance: Not in distress.   Eyes:      Conjunctiva/sclera: Conjunctivae normal.      Pupils: Pupils are equal, round, and reactive to light.   HENT:      Head: Normocephalic.   Pulmonary:      Effort: Pulmonary effort is normal.      Breath sounds: No wheezing. No rhonchi. No rales.   Cardiovascular:      PMI at left midclavicular line. Normal rate. Regular rhythm.      Murmurs: There is a grade 4/6 blowing systolic murmur.   Edema:     Peripheral edema absent.   Abdominal:      General: Bowel sounds are normal.      Palpations: Abdomen is soft.   Musculoskeletal: Normal range of motion.      Cervical back: Normal range of motion and neck supple. Skin:     General: Skin is warm and dry.   Neurological:      Mental Status: Alert, oriented to person, place, and time and oriented to person, place and time.      Gait: Gait is intact.   Psychiatric:         Mood and Affect: Mood normal.         Speech: Speech normal.         Behavior: Behavior is cooperative.          Procedures: None today         Assessment & Plan   Diagnoses and all orders for this visit:    1. Aortic valve stenosis, etiology of cardiac valve disease unspecified (Primary)  -     Stress Test With Myocardial Perfusion One Day; Future    2. Abnormal nuclear stress test  -     Stress Test With Myocardial Perfusion One Day; Future    3. Primary hypertension    4. Hypercholesteremia    5. Smoker    6. Dizziness  -     Stress Test With Myocardial Perfusion One Day; Future    7. Shortness of breath  -     Stress Test With Myocardial Perfusion One Day; Future    8. Chest pressure  -     Stress Test With Myocardial Perfusion One Day; Future      Aortic stenosis/dizziness  -Recent echocardiogram completed, results pending and have been requested from [facility]  -Echocardiogram May 2023 negative for hemodynamically significant stenosis    Abnormal nuclear stress test/chest pressure/dizziness/shortness of breath  -Patient agrees to repeat nuclear stress test to  "look for worsening ischemia  -Continue aspirin, statin  -Patient admits to having Nitrostat if needed and reinstructed on how to use for anginal symptoms    HTN  -BP stable  -Continue lisinopril 20 mg daily, amlodipine 5 mg daily    Hypercholesterolemia  Labs managed with PCP  -Continue Crestor    Smoking/vaping  -Patient admits to \"cutting back\"  -Patient does not feel he can completely stop nicotine use at this time    Further recommendations based on test results    6-month follow-up visit scheduled.               Electronically signed by Maria Guadalupe Redd, APRN,  October 16, 2024 10:14 EDT    Dictated Utilizing Dragon Dictation: Part of this note may be an electronic transcription/translation of spoken language to printed text using the Dragon Dictation System.    "

## 2024-10-18 ENCOUNTER — OUTSIDE FACILITY SERVICE (OUTPATIENT)
Dept: CARDIOLOGY | Facility: CLINIC | Age: 59
End: 2024-10-18
Payer: COMMERCIAL

## 2024-11-08 DIAGNOSIS — R94.39 ABNORMAL NUCLEAR STRESS TEST: ICD-10-CM

## 2024-11-11 RX ORDER — ISOSORBIDE MONONITRATE 30 MG/1
30 TABLET, EXTENDED RELEASE ORAL EVERY MORNING
Qty: 30 TABLET | Refills: 8 | Status: SHIPPED | OUTPATIENT
Start: 2024-11-11

## 2024-11-11 NOTE — TELEPHONE ENCOUNTER
Rx Refill Note  Requested Prescriptions     Pending Prescriptions Disp Refills    isosorbide mononitrate (IMDUR) 30 MG 24 hr tablet [Pharmacy Med Name: isosorbide mononitrate ER 30 mg tablet,extended release 24 hr] 30 tablet 8     Sig: TAKE ONE TABLET BY MOUTH EVERY MORNING      Last office visit with prescribing clinician: 10/16/2024   Last telemedicine visit with prescribing clinician: Visit date not found   Next office visit with prescribing clinician: 5/1/2025                         Would you like a call back once the refill request has been completed: [] Yes [] No    If the office needs to give you a call back, can they leave a voicemail: [] Yes [] No    Loretta Keane, DENISE  11/11/24, 11:02 EST

## 2024-11-25 ENCOUNTER — HOSPITAL ENCOUNTER (OUTPATIENT)
Dept: CARDIOLOGY | Facility: HOSPITAL | Age: 59
Discharge: HOME | End: 2024-11-25
Payer: COMMERCIAL

## 2024-11-25 DIAGNOSIS — R06.02 SHORTNESS OF BREATH: ICD-10-CM

## 2024-11-25 DIAGNOSIS — R94.39 ABNORMAL NUCLEAR STRESS TEST: ICD-10-CM

## 2024-11-25 DIAGNOSIS — R07.89 CHEST PRESSURE: ICD-10-CM

## 2024-11-25 DIAGNOSIS — R42 DIZZINESS: ICD-10-CM

## 2024-11-25 DIAGNOSIS — I35.0 AORTIC VALVE STENOSIS, ETIOLOGY OF CARDIAC VALVE DISEASE UNSPECIFIED: ICD-10-CM

## 2024-11-25 LAB
BH CV REST NUCLEAR ISOTOPE DOSE: 10 MCI
BH CV STRESS COMMENTS STAGE 1: NORMAL
BH CV STRESS DOSE REGADENOSON STAGE 1: 0.4
BH CV STRESS DURATION MIN STAGE 1: 0
BH CV STRESS DURATION SEC STAGE 1: 10
BH CV STRESS NUCLEAR ISOTOPE DOSE: 30 MCI
BH CV STRESS PROTOCOL 1: NORMAL
BH CV STRESS RECOVERY BP: NORMAL MMHG
BH CV STRESS RECOVERY HR: 114 BPM
BH CV STRESS STAGE 1: 1
LV EF NUC BP: 71 %
MAXIMAL PREDICTED HEART RATE: 161 BPM
PERCENT MAX PREDICTED HR: 81.37 %
STRESS BASELINE BP: NORMAL MMHG
STRESS BASELINE HR: 105 BPM
STRESS PERCENT HR: 96 %
STRESS POST PEAK BP: NORMAL MMHG
STRESS POST PEAK HR: 131 BPM
STRESS TARGET HR: 137 BPM

## 2024-11-25 PROCEDURE — 78452 HT MUSCLE IMAGE SPECT MULT: CPT

## 2024-11-25 PROCEDURE — 34310000005 TECHNETIUM SESTAMIBI: Performed by: INTERNAL MEDICINE

## 2024-11-25 PROCEDURE — A9500 TC99M SESTAMIBI: HCPCS | Performed by: INTERNAL MEDICINE

## 2024-11-25 PROCEDURE — 78452 HT MUSCLE IMAGE SPECT MULT: CPT | Performed by: INTERNAL MEDICINE

## 2024-11-25 PROCEDURE — 93018 CV STRESS TEST I&R ONLY: CPT | Performed by: INTERNAL MEDICINE

## 2024-11-25 PROCEDURE — 93017 CV STRESS TEST TRACING ONLY: CPT

## 2024-11-25 PROCEDURE — 25010000002 REGADENOSON 0.4 MG/5ML SOLUTION: Performed by: INTERNAL MEDICINE

## 2024-11-25 RX ORDER — REGADENOSON 0.08 MG/ML
0.4 INJECTION, SOLUTION INTRAVENOUS
Status: COMPLETED | OUTPATIENT
Start: 2024-11-25 | End: 2024-11-25

## 2024-11-25 RX ADMIN — TECHNETIUM TC 99M SESTAMIBI 1 DOSE: 1 INJECTION INTRAVENOUS at 08:41

## 2024-11-25 RX ADMIN — TECHNETIUM TC 99M SESTAMIBI 1 DOSE: 1 INJECTION INTRAVENOUS at 10:24

## 2024-11-25 RX ADMIN — REGADENOSON 0.4 MG: 0.08 INJECTION, SOLUTION INTRAVENOUS at 10:26

## 2024-11-28 RX ORDER — METOPROLOL SUCCINATE 25 MG/1
25 TABLET, EXTENDED RELEASE ORAL DAILY
Qty: 30 TABLET | Refills: 11 | Status: SHIPPED | OUTPATIENT
Start: 2024-11-28

## 2024-12-03 DIAGNOSIS — R42 DIZZINESS: ICD-10-CM

## 2024-12-03 DIAGNOSIS — R06.02 SHORTNESS OF BREATH: ICD-10-CM

## 2024-12-03 DIAGNOSIS — I35.0 AORTIC VALVE STENOSIS, ETIOLOGY OF CARDIAC VALVE DISEASE UNSPECIFIED: Primary | ICD-10-CM

## 2024-12-03 DIAGNOSIS — R07.89 CHEST PRESSURE: ICD-10-CM

## 2024-12-03 DIAGNOSIS — R94.39 ABNORMAL NUCLEAR STRESS TEST: ICD-10-CM

## 2024-12-06 DIAGNOSIS — I10 PRIMARY HYPERTENSION: ICD-10-CM

## 2024-12-06 DIAGNOSIS — R94.39 ABNORMAL NUCLEAR STRESS TEST: ICD-10-CM

## 2024-12-06 DIAGNOSIS — E78.00 HYPERCHOLESTEREMIA: ICD-10-CM

## 2024-12-11 RX ORDER — ROSUVASTATIN CALCIUM 10 MG/1
10 TABLET, COATED ORAL DAILY
Qty: 30 TABLET | Refills: 8 | Status: SHIPPED | OUTPATIENT
Start: 2024-12-11

## 2024-12-11 RX ORDER — ASPIRIN 81 MG/1
81 TABLET, COATED ORAL DAILY
Qty: 30 TABLET | Refills: 8 | Status: SHIPPED | OUTPATIENT
Start: 2024-12-11

## 2024-12-11 RX ORDER — AMLODIPINE BESYLATE 5 MG/1
5 TABLET ORAL DAILY
Qty: 30 TABLET | Refills: 8 | Status: SHIPPED | OUTPATIENT
Start: 2024-12-11

## 2024-12-18 ENCOUNTER — OUTSIDE FACILITY SERVICE (OUTPATIENT)
Dept: CARDIOLOGY | Facility: CLINIC | Age: 59
End: 2024-12-18
Payer: COMMERCIAL

## 2025-02-12 ENCOUNTER — OFFICE VISIT (OUTPATIENT)
Dept: CARDIOLOGY | Facility: CLINIC | Age: 60
End: 2025-02-12
Payer: COMMERCIAL

## 2025-02-12 VITALS
HEIGHT: 61 IN | HEART RATE: 75 BPM | BODY MASS INDEX: 34.36 KG/M2 | RESPIRATION RATE: 18 BRPM | OXYGEN SATURATION: 98 % | SYSTOLIC BLOOD PRESSURE: 124 MMHG | DIASTOLIC BLOOD PRESSURE: 68 MMHG | WEIGHT: 182 LBS

## 2025-02-12 DIAGNOSIS — Z72.89 VAPES NON-NICOTINE CONTAINING SUBSTANCE: ICD-10-CM

## 2025-02-12 DIAGNOSIS — I10 PRIMARY HYPERTENSION: ICD-10-CM

## 2025-02-12 DIAGNOSIS — E78.00 HYPERCHOLESTEREMIA: ICD-10-CM

## 2025-02-12 DIAGNOSIS — I35.0 AORTIC VALVE STENOSIS, ETIOLOGY OF CARDIAC VALVE DISEASE UNSPECIFIED: Primary | ICD-10-CM

## 2025-02-12 DIAGNOSIS — I27.20 PULMONARY HYPERTENSION: ICD-10-CM

## 2025-02-12 RX ORDER — TAMSULOSIN HYDROCHLORIDE 0.4 MG/1
1 CAPSULE ORAL DAILY
COMMUNITY
Start: 2024-11-08

## 2025-02-12 RX ORDER — LANSOPRAZOLE 30 MG/1
1 CAPSULE, DELAYED RELEASE ORAL DAILY
COMMUNITY
Start: 2025-01-17

## 2025-02-12 NOTE — PROGRESS NOTES
Chief Complaint   Patient presents with    Follow-up     Cardiac management - Patient states he has been doing well. Patient denies any chest pains, palpitations, shortness of breath, or leg swelling.    Labs Only     Patient states he has more updated lab work from when he was in the hospital.     Med Refill     Patient states he does not need any refills from us at this time. Patient states he was taken off some medication in the hospital and does not remember which ones they were. Patient made an educated guess at which medicines he wasn't taking. Patient stated he would bring in his medications or a detailed list next visit of what he currently takes.       Subjective       Byron Madsen is a 59 y.o. male initially seen 2023.  He has HTN, hypercholesterolemia, history of CP and SOA.  Prior echocardiogram showed moderate AAS and regurg.  5/11/2023 Lexiscan stress test small lateral wall ischemia.  Imdur prescribed.  Echocardiogram showed AS with SIOMARA 1.5 cm² and pressure 25 over 40 mg of mercury.  PA pressure 40 mmHg.  Carotid ultrasound negative.  At October 2024 office visit he had recurrent symptoms and ultimately underwent cardiac catheterization on 12/18/2024 showing normal coronaries, mild PHT, and moderate AS.  Gradient was not significant enough to undergo valve replacement.    Today he returns to the office for a hospital follow-up visit. He denies cardiac symptoms. He did not bring in medications or list but states he is not taking cholesterol medication any more, but states he is taking blood pressure medication.     Cardiac History:    Past Surgical History:   Procedure Laterality Date    CARDIAC CATHETERIZATION  12/18/2024    Normal Coronaries. SIOMARA- 1.18 Cm2    CARDIOVASCULAR STRESS TEST  05/11/2023    Lexiscan- EF 69%. Lateral Ischemia    CARDIOVASCULAR STRESS TEST  11/25/2024    Lexiscan- EF > 70%. R/O Lateral Infarct    ECHO - CONVERTED  12/07/2022    @ [facility]. EF 60%. SIOMARA- 1.6. 19/31. Mild-Mod AI.  Mild MR. RVSP-25 mmHg.    ECHO - CONVERTED  2023    EF 65%. SIOMARA- 1.5 Cm2. 25/40. Trace-Mild MR. RVSP- 40 mmHg    US CAROTID UNILATERAL  2023    No sig lesions       Current Outpatient Medications   Medication Sig Dispense Refill    amLODIPine (NORVASC) 5 MG tablet TAKE ONE TABLET BY MOUTH EVERY DAY 30 tablet 8    Aspirin Low Dose 81 MG EC tablet TAKE ONE TABLET BY MOUTH EVERY DAY 30 tablet 8    Diclofenac Sodium (VOLTAREN) 1 % gel gel APPLY 2 GRAMS TO AFFECTED AREA(S) TWICE A DAY AS NEEDED      lansoprazole (PREVACID) 30 MG capsule Take 1 capsule by mouth Daily.      lisinopril (PRINIVIL,ZESTRIL) 20 MG tablet Take 1 tablet by mouth Daily.      nitroglycerin (NITROSTAT) 0.4 MG SL tablet Place 1 tablet under the tongue Every 5 (Five) Minutes As Needed for Chest Pain. Take no more than 3 doses in 15 minutes. 25 tablet 1    omeprazole (priLOSEC) 20 MG capsule Take 1 capsule by mouth Daily.      risperiDONE (risperDAL) 1 MG tablet Take 2 tablets by mouth Every Night.      rosuvastatin (CRESTOR) 10 MG tablet TAKE ONE TABLET BY MOUTH EVERY DAY 30 tablet 8    sertraline (ZOLOFT) 50 MG tablet Take 1 tablet by mouth Daily.      tamsulosin (FLOMAX) 0.4 MG capsule 24 hr capsule Take 1 capsule by mouth Daily.      VITAMIN D, CHOLECALCIFEROL, PO Take 5,000 Units by mouth Daily.       No current facility-administered medications for this visit.       Patient has no known allergies.    Past Medical History:   Diagnosis Date    Bipolar disorder     Depression     GERD (gastroesophageal reflux disease)     Heart murmur     Hyperlipidemia     Hypertension        Social History     Socioeconomic History    Marital status: Single   Tobacco Use    Smoking status: Former     Current packs/day: 0.00     Average packs/day: 1.5 packs/day for 1 year (1.5 ttl pk-yrs)     Types: Cigarettes     Start date:      Quit date: 2018     Years since quittin.1    Smokeless tobacco: Never   Vaping Use    Vaping status: Every Day     "Substances: Nicotine, Flavoring    Devices: Refillable tank   Substance and Sexual Activity    Alcohol use: Not Currently    Drug use: Yes     Types: Marijuana     Comment: last used Meth in 2018, currently smokes about 8 joints daily of marijuana    Sexual activity: Defer       Family History   Problem Relation Age of Onset    Hypertension Mother     Hyperlipidemia Mother     Heart attack Mother          at 78 with MI    Hypertension Father     Hyperlipidemia Father     Heart attack Sister         MI in her 30's    Hyperlipidemia Sister     Hypertension Sister     Diabetes Sister     Hypertension Sister     Hyperlipidemia Sister     Hypertension Sister     Hyperlipidemia Sister     Other Brother          at 42, history of brain damage since birth    Diabetes Maternal Grandmother     Heart attack Maternal Grandmother     Diabetes Maternal Grandfather     Heart attack Maternal Grandfather     Diabetes Paternal Grandmother     Heart attack Paternal Grandmother     Diabetes Paternal Grandfather     Heart attack Paternal Grandfather        Review of Systems   Constitutional: Negative for diaphoresis and fever.   Cardiovascular:  Negative for chest pain, dyspnea on exertion, leg swelling, near-syncope and palpitations.   Hematologic/Lymphatic: Negative for bleeding problem.        BP Readings from Last 5 Encounters:   25 124/68   10/16/24 130/76   11/15/23 140/90   23 140/80       Wt Readings from Last 5 Encounters:   25 82.6 kg (182 lb)   10/16/24 74.8 kg (164 lb 12.8 oz)   11/15/23 74.8 kg (165 lb)   23 73.9 kg (163 lb)       Objective     /68 (BP Location: Left arm, Patient Position: Sitting, Cuff Size: Adult)   Pulse 75   Resp 18   Ht 154.9 cm (60.98\")   Wt 82.6 kg (182 lb)   SpO2 98%   BMI 34.41 kg/m²     Vitals and nursing note reviewed.   Constitutional:       Appearance: Not in distress.   HENT:      Head: Normocephalic.   Pulmonary:      Effort: Pulmonary effort is " normal.      Breath sounds: Normal breath sounds.   Cardiovascular:      PMI at left midclavicular line. Normal rate. Regular rhythm.      Murmurs: There is a grade 3to 4/6 harsh midsystolic murmur at the URSB, radiating to the neck.   Edema:     Peripheral edema absent.   Skin:     General: Skin is warm and dry.   Neurological:      Mental Status: Alert, oriented to person, place, and time and oriented to person, place and time.   Psychiatric:         Behavior: Behavior is cooperative.          Procedures: None today         Assessment & Plan   Diagnoses and all orders for this visit:    1. Aortic valve stenosis, etiology of cardiac valve disease unspecified (Primary)    2. Pulmonary hypertension    3. Primary hypertension    4. Hypercholesteremia    5. Vapes non-nicotine containing substance      Aortic stenosis  -Cardiac catheterization December 2024: Moderate aortic stenosis.  Gradient not significant enough for surgery  -Continue surveillance through echocardiogram.  -Continue good BP control  -Currently denies symptoms or concerns.     Cardiac status  -Essentially normal coronaries per cath  -Continue prevention of CAD  -Patient admits no longer on statin.  Advised to follow with PCP in regards  -Continue aspirin     HTN  -BP stable  -Continue lisinopril 20 mg daily, amlodipine 5 mg daily     Hypercholesterolemia  -Patient admits no longer on Crestor  -Advised patient to follow-up with PCP for monitoring/management    Smoking/vaping  -Patient does not feel he can completely stop nicotine use at this time     Annual follow-up visit scheduled.             Electronically signed by TAL Forbes,  February 12, 2025 16:31 EST    Dictated Utilizing Dragon Dictation: Part of this note may be an electronic transcription/translation of spoken language to printed text using the Dragon Dictation System.